# Patient Record
Sex: MALE | Race: WHITE | NOT HISPANIC OR LATINO | URBAN - NONMETROPOLITAN AREA
[De-identification: names, ages, dates, MRNs, and addresses within clinical notes are randomized per-mention and may not be internally consistent; named-entity substitution may affect disease eponyms.]

---

## 2017-05-02 ENCOUNTER — OFFICE (OUTPATIENT)
Dept: URBAN - NONMETROPOLITAN AREA CLINIC 13 | Facility: CLINIC | Age: 77
End: 2017-05-02
Payer: COMMERCIAL

## 2017-05-02 VITALS
WEIGHT: 226 LBS | DIASTOLIC BLOOD PRESSURE: 78 MMHG | HEIGHT: 73 IN | HEART RATE: 77 BPM | SYSTOLIC BLOOD PRESSURE: 113 MMHG | RESPIRATION RATE: 18 BRPM

## 2017-05-02 VITALS — HEIGHT: 73 IN

## 2017-05-02 DIAGNOSIS — N28.1 CYST OF KIDNEY, ACQUIRED: ICD-10-CM

## 2017-05-02 DIAGNOSIS — K59.09 OTHER CONSTIPATION: ICD-10-CM

## 2017-05-02 DIAGNOSIS — R13.19 OTHER DYSPHAGIA: ICD-10-CM

## 2017-05-02 DIAGNOSIS — K76.0 FATTY (CHANGE OF) LIVER, NOT ELSEWHERE CLASSIFIED: ICD-10-CM

## 2017-05-02 DIAGNOSIS — K21.9 GASTRO-ESOPHAGEAL REFLUX DISEASE WITHOUT ESOPHAGITIS: ICD-10-CM

## 2017-05-02 DIAGNOSIS — K64.9 UNSPECIFIED HEMORRHOIDS: ICD-10-CM

## 2017-05-02 DIAGNOSIS — R07.9 CHEST PAIN, UNSPECIFIED: ICD-10-CM

## 2017-05-02 DIAGNOSIS — R07.89 OTHER CHEST PAIN: ICD-10-CM

## 2017-05-02 PROCEDURE — 99213 OFFICE O/P EST LOW 20 MIN: CPT | Mod: 25

## 2017-05-02 PROCEDURE — 76700 US EXAM ABDOM COMPLETE: CPT | Mod: 26,TC

## 2017-05-02 RX ORDER — BACLOFEN 10 MG/1
TABLET ORAL
Qty: 45 | Refills: 5 | Status: COMPLETED
Start: 2017-05-02 | End: 2023-07-14

## 2017-06-13 ENCOUNTER — OFFICE (OUTPATIENT)
Dept: URBAN - NONMETROPOLITAN AREA CLINIC 13 | Facility: CLINIC | Age: 77
End: 2017-06-13
Payer: COMMERCIAL

## 2017-06-13 VITALS
DIASTOLIC BLOOD PRESSURE: 82 MMHG | HEART RATE: 67 BPM | WEIGHT: 223 LBS | SYSTOLIC BLOOD PRESSURE: 149 MMHG | HEIGHT: 73 IN

## 2017-06-13 DIAGNOSIS — R22.2 LOCALIZED SWELLING, MASS AND LUMP, TRUNK: ICD-10-CM

## 2017-06-13 DIAGNOSIS — R13.19 OTHER DYSPHAGIA: ICD-10-CM

## 2017-06-13 DIAGNOSIS — K76.0 FATTY (CHANGE OF) LIVER, NOT ELSEWHERE CLASSIFIED: ICD-10-CM

## 2017-06-13 DIAGNOSIS — N28.1 CYST OF KIDNEY, ACQUIRED: ICD-10-CM

## 2017-06-13 DIAGNOSIS — R07.9 CHEST PAIN, UNSPECIFIED: ICD-10-CM

## 2017-06-13 PROCEDURE — 99213 OFFICE O/P EST LOW 20 MIN: CPT

## 2017-06-13 RX ORDER — BACLOFEN 10 MG/1
TABLET ORAL
Qty: 45 | Refills: 5 | Status: COMPLETED
Start: 2017-05-02 | End: 2023-07-14

## 2017-06-13 RX ORDER — VITAMIN E 268 MG
CAPSULE ORAL
Qty: 60 | Refills: 5 | Status: ACTIVE
Start: 2016-09-19

## 2017-06-13 RX ORDER — ESOMEPRAZOLE MAGNESIUM 40 MG/1
CAPSULE, DELAYED RELEASE ORAL
Qty: 90 | Refills: 1 | Status: COMPLETED
End: 2023-07-14

## 2017-11-03 ENCOUNTER — OFFICE (OUTPATIENT)
Dept: URBAN - NONMETROPOLITAN AREA CLINIC 13 | Facility: CLINIC | Age: 77
End: 2017-11-03
Payer: MEDICARE

## 2017-11-03 ENCOUNTER — OFFICE (OUTPATIENT)
Dept: URBAN - NONMETROPOLITAN AREA CLINIC 13 | Facility: CLINIC | Age: 77
End: 2017-11-03
Payer: COMMERCIAL

## 2017-11-03 VITALS
DIASTOLIC BLOOD PRESSURE: 78 MMHG | HEIGHT: 73 IN | SYSTOLIC BLOOD PRESSURE: 121 MMHG | HEART RATE: 69 BPM | WEIGHT: 221 LBS

## 2017-11-03 VITALS — HEIGHT: 73 IN

## 2017-11-03 DIAGNOSIS — R10.11 RIGHT UPPER QUADRANT PAIN: ICD-10-CM

## 2017-11-03 DIAGNOSIS — K21.9 GASTRO-ESOPHAGEAL REFLUX DISEASE WITHOUT ESOPHAGITIS: ICD-10-CM

## 2017-11-03 DIAGNOSIS — Z79.899 OTHER LONG TERM (CURRENT) DRUG THERAPY: ICD-10-CM

## 2017-11-03 DIAGNOSIS — K76.0 FATTY (CHANGE OF) LIVER, NOT ELSEWHERE CLASSIFIED: ICD-10-CM

## 2017-11-03 DIAGNOSIS — G62.9 POLYNEUROPATHY, UNSPECIFIED: ICD-10-CM

## 2017-11-03 DIAGNOSIS — R13.19 OTHER DYSPHAGIA: ICD-10-CM

## 2017-11-03 DIAGNOSIS — R07.9 CHEST PAIN, UNSPECIFIED: ICD-10-CM

## 2017-11-03 DIAGNOSIS — R93.2 ABNORMAL FINDINGS ON DIAGNOSTIC IMAGING OF LIVER AND BILIARY: ICD-10-CM

## 2017-11-03 DIAGNOSIS — R11.0 NAUSEA: ICD-10-CM

## 2017-11-03 DIAGNOSIS — R14.0 ABDOMINAL DISTENSION (GASEOUS): ICD-10-CM

## 2017-11-03 DIAGNOSIS — R10.13 EPIGASTRIC PAIN: ICD-10-CM

## 2017-11-03 LAB
AMYLASE: 33.1 U/L (ref 25–125)
CBC SYSMEX: HEMATOCRIT: 40.3 % (ref 37–47)
CBC SYSMEX: HEMOGLOBIN: 13.8 G/DL — LOW (ref 14–18)
CBC SYSMEX: LYMPHS %: 31.5 % (ref 20.5–40.5)
CBC SYSMEX: LYMPHS ABSOLUTE: 2 10*3U/L (ref 1.3–2.9)
CBC SYSMEX: MCH: 29 PG (ref 27–32)
CBC SYSMEX: MCHC: 34.2 G/DL (ref 28.5–35)
CBC SYSMEX: MCV: 84.7 FL (ref 84–100)
CBC SYSMEX: MONOS %: 10.2 % — HIGH (ref 2–10)
CBC SYSMEX: MONOS ABSOLUTE: 0.7 10*3U/L (ref 0.3–3.8)
CBC SYSMEX: MPV: 8.4 FL (ref 7.4–10.4)
CBC SYSMEX: NEUT. %: 58.3 % (ref 43–65)
CBC SYSMEX: NEUT. ABSOLUTE: 3.7 10*3U/L (ref 2.2–4.8)
CBC SYSMEX: PLATELETS: 94 10*3U/L — LOW (ref 130–440)
CBC SYSMEX: RBC: 4.8 10*6U/L (ref 4.2–5.4)
CBC SYSMEX: RDW: 13.3 % (ref 11.5–15.5)
CBC SYSMEX: WBC: 6.4 10*3U/L (ref 4.5–10.5)
COMPLETE METABOLIC: ALBUMIN: 3.9 G/DL (ref 3.5–5.2)
COMPLETE METABOLIC: ALK. PHOS: 60 U/L (ref 40–150)
COMPLETE METABOLIC: ALT: 24 U/L (ref 0–55)
COMPLETE METABOLIC: AST: 32 U/L (ref 5–34)
COMPLETE METABOLIC: BUN: 16 MG/DL (ref 7–26)
COMPLETE METABOLIC: CALCIUM: 8.5 MG/DL (ref 8.4–10.2)
COMPLETE METABOLIC: CHLORIDE: 105 MMOL/L (ref 98–107)
COMPLETE METABOLIC: CO2: 29 MEQ/L (ref 22–29)
COMPLETE METABOLIC: CREATININE: 1.32 MG/DL — HIGH (ref 0.57–1.25)
COMPLETE METABOLIC: GFR - AFRICAN AMERICAN: 68.1 (ref 59–200)
COMPLETE METABOLIC: GFR - NON AFRICAN AMERICAN: 56.2 — CRITICAL LOW (ref 59–200)
COMPLETE METABOLIC: GLUCOSE: 96 MG/DL (ref 70–99)
COMPLETE METABOLIC: POTASSIUM: 3.7 MMOL/L (ref 3.5–5.3)
COMPLETE METABOLIC: SODIUM: 140 MMOL/L (ref 136–145)
COMPLETE METABOLIC: TOTAL BILIRUBIN: 1.3 MG/DL — HIGH (ref 0.2–1.2)
COMPLETE METABOLIC: TOTAL PROTEIN: 6 G/DL — LOW (ref 6.4–8.3)
LIPASE: 20 U/L (ref 8–78)

## 2017-11-03 PROCEDURE — 82150 ASSAY OF AMYLASE: CPT

## 2017-11-03 PROCEDURE — 99215 OFFICE O/P EST HI 40 MIN: CPT

## 2017-11-03 PROCEDURE — 85025 COMPLETE CBC W/AUTO DIFF WBC: CPT

## 2017-11-03 PROCEDURE — 80053 COMPREHEN METABOLIC PANEL: CPT

## 2017-11-03 PROCEDURE — 83690 ASSAY OF LIPASE: CPT

## 2017-11-08 ENCOUNTER — AMBULATORY SURGICAL CENTER (OUTPATIENT)
Dept: URBAN - NONMETROPOLITAN AREA SURGERY 2 | Facility: SURGERY | Age: 77
End: 2017-11-08
Payer: COMMERCIAL

## 2017-11-08 ENCOUNTER — OFFICE (OUTPATIENT)
Dept: URBAN - NONMETROPOLITAN AREA CLINIC 13 | Facility: CLINIC | Age: 77
End: 2017-11-08
Payer: COMMERCIAL

## 2017-11-08 VITALS
DIASTOLIC BLOOD PRESSURE: 100 MMHG | SYSTOLIC BLOOD PRESSURE: 181 MMHG | OXYGEN SATURATION: 93 % | SYSTOLIC BLOOD PRESSURE: 168 MMHG | OXYGEN SATURATION: 98 % | DIASTOLIC BLOOD PRESSURE: 110 MMHG | HEART RATE: 83 BPM | SYSTOLIC BLOOD PRESSURE: 173 MMHG | DIASTOLIC BLOOD PRESSURE: 97 MMHG | HEART RATE: 71 BPM | SYSTOLIC BLOOD PRESSURE: 179 MMHG | OXYGEN SATURATION: 99 % | RESPIRATION RATE: 18 BRPM | HEIGHT: 73 IN | SYSTOLIC BLOOD PRESSURE: 191 MMHG | RESPIRATION RATE: 16 BRPM | HEART RATE: 76 BPM | DIASTOLIC BLOOD PRESSURE: 87 MMHG | HEART RATE: 66 BPM | HEART RATE: 82 BPM | HEART RATE: 73 BPM | DIASTOLIC BLOOD PRESSURE: 81 MMHG | WEIGHT: 221 LBS | HEART RATE: 68 BPM | SYSTOLIC BLOOD PRESSURE: 148 MMHG | DIASTOLIC BLOOD PRESSURE: 124 MMHG | DIASTOLIC BLOOD PRESSURE: 104 MMHG | SYSTOLIC BLOOD PRESSURE: 155 MMHG | OXYGEN SATURATION: 92 %

## 2017-11-08 VITALS — HEIGHT: 73 IN

## 2017-11-08 DIAGNOSIS — R13.19 OTHER DYSPHAGIA: ICD-10-CM

## 2017-11-08 DIAGNOSIS — R10.11 RIGHT UPPER QUADRANT PAIN: ICD-10-CM

## 2017-11-08 DIAGNOSIS — K22.2 ESOPHAGEAL OBSTRUCTION: ICD-10-CM

## 2017-11-08 DIAGNOSIS — K29.70 GASTRITIS, UNSPECIFIED, WITHOUT BLEEDING: ICD-10-CM

## 2017-11-08 DIAGNOSIS — R11.0 NAUSEA: ICD-10-CM

## 2017-11-08 DIAGNOSIS — K76.0 FATTY (CHANGE OF) LIVER, NOT ELSEWHERE CLASSIFIED: ICD-10-CM

## 2017-11-08 DIAGNOSIS — K21.9 GASTRO-ESOPHAGEAL REFLUX DISEASE WITHOUT ESOPHAGITIS: ICD-10-CM

## 2017-11-08 DIAGNOSIS — R10.13 EPIGASTRIC PAIN: ICD-10-CM

## 2017-11-08 DIAGNOSIS — R07.89 OTHER CHEST PAIN: ICD-10-CM

## 2017-11-08 PROCEDURE — G8918 PT W/O PREOP ORDER IV AB PRO: HCPCS | Performed by: INTERNAL MEDICINE

## 2017-11-08 PROCEDURE — 74170 CT ABD WO CNTRST FLWD CNTRST: CPT | Mod: TC

## 2017-11-08 PROCEDURE — 43248 EGD GUIDE WIRE INSERTION: CPT | Performed by: INTERNAL MEDICINE

## 2017-11-08 PROCEDURE — 43239 EGD BIOPSY SINGLE/MULTIPLE: CPT | Performed by: INTERNAL MEDICINE

## 2017-11-08 PROCEDURE — G9654 MON ANESTH CARE: HCPCS

## 2017-11-08 PROCEDURE — G8907 PT DOC NO EVENTS ON DISCHARG: HCPCS | Performed by: INTERNAL MEDICINE

## 2017-11-08 PROCEDURE — 00740: CPT | Mod: QS,QZ

## 2017-11-08 RX ORDER — VITAMIN E 268 MG
CAPSULE ORAL
Qty: 60 | Refills: 5 | Status: ACTIVE
Start: 2016-09-19

## 2017-11-08 RX ORDER — ESOMEPRAZOLE MAGNESIUM 40 MG/1
CAPSULE, DELAYED RELEASE ORAL
Qty: 90 | Refills: 1 | Status: COMPLETED
End: 2023-07-14

## 2017-11-08 RX ORDER — BACLOFEN 10 MG/1
TABLET ORAL
Qty: 45 | Refills: 5 | Status: COMPLETED
Start: 2017-05-02 | End: 2023-07-14

## 2017-11-08 RX ORDER — RANITIDINE HYDROCHLORIDE 300 MG/1
TABLET, FILM COATED ORAL
Qty: 180 | Refills: 1 | Status: COMPLETED
Start: 2017-11-08 | End: 2023-07-14

## 2017-11-08 NOTE — SERVICENOTES
Patient premedicated with 50mg Benadryl and 125mg solumedrol prior to IV contrast injection due to allergy

## 2017-11-09 ENCOUNTER — OFFICE (OUTPATIENT)
Dept: URBAN - NONMETROPOLITAN AREA CLINIC 13 | Facility: CLINIC | Age: 77
End: 2017-11-09
Payer: MEDICARE

## 2017-11-09 VITALS — HEIGHT: 73 IN

## 2017-11-09 DIAGNOSIS — Z79.01 LONG TERM (CURRENT) USE OF ANTICOAGULANTS: ICD-10-CM

## 2017-11-09 PROCEDURE — 85610 PROTHROMBIN TIME: CPT

## 2017-11-13 LAB — SURGICAL PROCEDURE: PDF REPORT: (no result)

## 2018-10-31 ENCOUNTER — OFFICE (OUTPATIENT)
Dept: URBAN - NONMETROPOLITAN AREA CLINIC 13 | Facility: CLINIC | Age: 78
End: 2018-10-31
Payer: MEDICARE

## 2018-10-31 ENCOUNTER — OFFICE (OUTPATIENT)
Dept: URBAN - NONMETROPOLITAN AREA CLINIC 13 | Facility: CLINIC | Age: 78
End: 2018-10-31
Payer: COMMERCIAL

## 2018-10-31 VITALS
OXYGEN SATURATION: 99 % | WEIGHT: 234 LBS | DIASTOLIC BLOOD PRESSURE: 70 MMHG | SYSTOLIC BLOOD PRESSURE: 118 MMHG | HEIGHT: 73 IN | HEART RATE: 76 BPM

## 2018-10-31 VITALS — HEIGHT: 73 IN

## 2018-10-31 DIAGNOSIS — K76.0 FATTY (CHANGE OF) LIVER, NOT ELSEWHERE CLASSIFIED: ICD-10-CM

## 2018-10-31 DIAGNOSIS — K21.9 GASTRO-ESOPHAGEAL REFLUX DISEASE WITHOUT ESOPHAGITIS: ICD-10-CM

## 2018-10-31 DIAGNOSIS — K64.9 UNSPECIFIED HEMORRHOIDS: ICD-10-CM

## 2018-10-31 DIAGNOSIS — R14.0 ABDOMINAL DISTENSION (GASEOUS): ICD-10-CM

## 2018-10-31 LAB
CBC SYSMEX: HEMATOCRIT: 32.9 % — LOW (ref 37–47)
CBC SYSMEX: HEMOGLOBIN: 10.4 G/DL — LOW (ref 14–18)
CBC SYSMEX: LYMPHS %: 21.9 % (ref 20.5–40.5)
CBC SYSMEX: LYMPHS ABSOLUTE: 1.4 10*3U/L (ref 1.3–2.9)
CBC SYSMEX: MCH: 24.6 PG — LOW (ref 27–32)
CBC SYSMEX: MCHC: 31.6 G/DL (ref 28.5–35)
CBC SYSMEX: MCV: 77.8 FL — LOW (ref 84–100)
CBC SYSMEX: MONOS %: 12.9 % — HIGH (ref 2–10)
CBC SYSMEX: MONOS ABSOLUTE: 0.8 10*3U/L (ref 0.3–3.8)
CBC SYSMEX: MPV: 8 FL — LOW (ref 8.3–11.9)
CBC SYSMEX: NEUT. %: 65.2 % (ref 43–67)
CBC SYSMEX: NEUT. ABSOLUTE: 4 10*3U/L (ref 2.2–4.8)
CBC SYSMEX: PLATELETS: 122 10*3U/L — LOW (ref 130–440)
CBC SYSMEX: RBC: 4.2 10*6U/L (ref 4.2–5.4)
CBC SYSMEX: RDW: 13.9 % (ref 11.5–15.5)
CBC SYSMEX: WBC: 6.2 10*3U/L (ref 4.5–10.5)
COMPLETE METABOLIC: ALBUMIN: 3.7 G/DL (ref 3.5–5.2)
COMPLETE METABOLIC: ALK. PHOS: 57 U/L (ref 40–150)
COMPLETE METABOLIC: ALT: 19 U/L (ref 0–55)
COMPLETE METABOLIC: AST: 24 U/L (ref 5–34)
COMPLETE METABOLIC: BUN: 16 MG/DL (ref 7–26)
COMPLETE METABOLIC: CALCIUM: 8.7 MG/DL (ref 8.4–10.3)
COMPLETE METABOLIC: CHLORIDE: 106 MMOL/L (ref 98–107)
COMPLETE METABOLIC: CO2: 28 MEQ/L (ref 22–29)
COMPLETE METABOLIC: CREATININE: 1.4 MG/DL — HIGH (ref 0.57–1.25)
COMPLETE METABOLIC: GFR - AFRICAN AMERICAN: 63.5 (ref 59–200)
COMPLETE METABOLIC: GFR - NON AFRICAN AMERICAN: 52.4 — CRITICAL LOW (ref 59–200)
COMPLETE METABOLIC: GLUCOSE: 103 MG/DL — HIGH (ref 70–99)
COMPLETE METABOLIC: POTASSIUM: 3.5 MMOL/L (ref 3.5–5.3)
COMPLETE METABOLIC: SODIUM: 140 MMOL/L (ref 136–145)
COMPLETE METABOLIC: TOTAL BILIRUBIN: 1.1 MG/DL (ref 0.2–1.2)
COMPLETE METABOLIC: TOTAL PROTEIN: 6 G/DL — LOW (ref 6.4–8.3)

## 2018-10-31 PROCEDURE — 80053 COMPREHEN METABOLIC PANEL: CPT | Performed by: NURSE PRACTITIONER

## 2018-10-31 PROCEDURE — 99213 OFFICE O/P EST LOW 20 MIN: CPT | Performed by: NURSE PRACTITIONER

## 2018-10-31 PROCEDURE — 85025 COMPLETE CBC W/AUTO DIFF WBC: CPT | Performed by: NURSE PRACTITIONER

## 2018-10-31 PROCEDURE — 36415 COLL VENOUS BLD VENIPUNCTURE: CPT | Performed by: NURSE PRACTITIONER

## 2018-10-31 RX ORDER — HYDROCORTISONE 25 MG/G
CREAM TOPICAL
Qty: 30 | Refills: 1 | Status: COMPLETED
Start: 2018-10-31 | End: 2023-07-14

## 2022-08-25 ENCOUNTER — OFFICE (OUTPATIENT)
Dept: URBAN - NONMETROPOLITAN AREA CLINIC 13 | Facility: CLINIC | Age: 82
End: 2022-08-25
Payer: COMMERCIAL

## 2022-08-25 VITALS
HEIGHT: 73 IN | OXYGEN SATURATION: 95 % | SYSTOLIC BLOOD PRESSURE: 142 MMHG | WEIGHT: 228 LBS | SYSTOLIC BLOOD PRESSURE: 105 MMHG | DIASTOLIC BLOOD PRESSURE: 65 MMHG | HEART RATE: 80 BPM | DIASTOLIC BLOOD PRESSURE: 57 MMHG

## 2022-08-25 VITALS — HEIGHT: 73 IN

## 2022-08-25 DIAGNOSIS — R13.10 DYSPHAGIA, UNSPECIFIED: ICD-10-CM

## 2022-08-25 DIAGNOSIS — R10.33 PERIUMBILICAL PAIN: ICD-10-CM

## 2022-08-25 DIAGNOSIS — K76.0 FATTY (CHANGE OF) LIVER, NOT ELSEWHERE CLASSIFIED: ICD-10-CM

## 2022-08-25 DIAGNOSIS — R10.13 EPIGASTRIC PAIN: ICD-10-CM

## 2022-08-25 LAB
AMYLASE: 38 UNITS/L (ref 30–110)
CBC WITH WBC (DIFF): ACANTHOCYTE: NORMAL
CBC WITH WBC (DIFF): AGRANULAR NEUTROPHIL: NORMAL
CBC WITH WBC (DIFF): ANISOCYTOSIS: (no result)
CBC WITH WBC (DIFF): ATYPICAL LYMPHOCYTE: NORMAL
CBC WITH WBC (DIFF): AUER RODS: NORMAL
CBC WITH WBC (DIFF): BAND: NORMAL
CBC WITH WBC (DIFF): BASOPHIL: 2 % — HIGH (ref 0–1)
CBC WITH WBC (DIFF): BASOPHILIC STIPPLING: NORMAL
CBC WITH WBC (DIFF): BI-LOBED NEUTROPHIL: NORMAL
CBC WITH WBC (DIFF): BLAST: NORMAL
CBC WITH WBC (DIFF): BURR CELL: NORMAL
CBC WITH WBC (DIFF): DIMORPHIC RBC POPULATION: NORMAL
CBC WITH WBC (DIFF): DOHLE BODIES: NORMAL
CBC WITH WBC (DIFF): ELLIPTOCYTE: NORMAL
CBC WITH WBC (DIFF): EOSINOPHIL: 3 % (ref 0–5)
CBC WITH WBC (DIFF): GIANT PLATELET: NORMAL
CBC WITH WBC (DIFF): HEMATOCRIT: 26.6 % — LOW (ref 41–53)
CBC WITH WBC (DIFF): HEMOGLOBIN: 6.4 G/DL — LOW (ref 14–18)
CBC WITH WBC (DIFF): HOWELL JOLLY BODIES: NORMAL
CBC WITH WBC (DIFF): HYPERSEGMENTED NEUTROPHIL: NORMAL
CBC WITH WBC (DIFF): HYPOCHROMIA: (no result)
CBC WITH WBC (DIFF): HYPOGRANULAR NEUTROPHIL: NORMAL
CBC WITH WBC (DIFF): IMMATURE GRANULOCYTES: NORMAL
CBC WITH WBC (DIFF): LARGE PLATELET: NORMAL
CBC WITH WBC (DIFF): LYMPHOCYTE: 21 % (ref 20–40)
CBC WITH WBC (DIFF): MACROCYTOSIS: NORMAL
CBC WITH WBC (DIFF): MEAN CELL HEMOGLOBIN CONCENTRATION: 24.1 G/DL — LOW (ref 33–37)
CBC WITH WBC (DIFF): MEAN CELL HEMOGLOBIN: 17.1 PG — LOW (ref 27–31)
CBC WITH WBC (DIFF): MEAN CELL VOLUME: 71 FL — LOW (ref 84–100)
CBC WITH WBC (DIFF): MEAN PLATELET VOLUME: 9.3 FL (ref 8.3–11.9)
CBC WITH WBC (DIFF): METAMYELOCYTE: NORMAL
CBC WITH WBC (DIFF): MICROCYTOSIS: PRESENT
CBC WITH WBC (DIFF): MIX CELLS: NORMAL
CBC WITH WBC (DIFF): MONOCYTE: 7 % (ref 1–10)
CBC WITH WBC (DIFF): MYELOCYTE: NORMAL
CBC WITH WBC (DIFF): NEUTROPHIL SEGMENTED: 68 % (ref 50–70)
CBC WITH WBC (DIFF): NOTE: NORMAL
CBC WITH WBC (DIFF): NUCLEATED RBC: 0 /100WBC (ref 0–0)
CBC WITH WBC (DIFF): OVALOCYTE: NORMAL
CBC WITH WBC (DIFF): PAPPENHEIMER BODIES: NORMAL
CBC WITH WBC (DIFF): PATHOLOGIST INTERPRETATION: NORMAL
CBC WITH WBC (DIFF): PLATELET CLUMPS: NORMAL
CBC WITH WBC (DIFF): PLATELET COUNT: 150 /NL (ref 140–440)
CBC WITH WBC (DIFF): PLT SATELLITOSIS: NORMAL
CBC WITH WBC (DIFF): POIKILOCYTOSIS: NORMAL
CBC WITH WBC (DIFF): POLYCHROMASIA: NORMAL
CBC WITH WBC (DIFF): PROMYELOCYTE: NORMAL
CBC WITH WBC (DIFF): RBC MORPHOLOGY: (no result)
CBC WITH WBC (DIFF): REACT LYMPH ABS MAN: NORMAL
CBC WITH WBC (DIFF): REACTIVE LYMPHOCYTE: NORMAL
CBC WITH WBC (DIFF): RED BLOOD CELL: 3.75 /PL — LOW (ref 4.7–6.1)
CBC WITH WBC (DIFF): RED CELL DIAMETER WIDTH: 48 FL (ref 35–48)
CBC WITH WBC (DIFF): ROULEAUX RBC: NORMAL
CBC WITH WBC (DIFF): SCHISTOCYTE: NORMAL
CBC WITH WBC (DIFF): SICKLE CELL: NORMAL
CBC WITH WBC (DIFF): SMUDGE CELLS: NORMAL
CBC WITH WBC (DIFF): SPHEROCYTE: NORMAL
CBC WITH WBC (DIFF): STOMATOCYTE: NORMAL
CBC WITH WBC (DIFF): TARGET CELL: NORMAL
CBC WITH WBC (DIFF): TEAR DROP CELL: NORMAL
CBC WITH WBC (DIFF): TOXIC GRANULATION: NORMAL
CBC WITH WBC (DIFF): UNCLASSIFIED CELL: NORMAL
CBC WITH WBC (DIFF): VACUOLATED NEUTROPHIL: NORMAL
CBC WITH WBC (DIFF): WBC MORPHOLOGY: NORMAL
CBC WITH WBC (DIFF): WHITE BLOOD CELL: 6.1 /NL (ref 4.8–11)
COMPREHENSIVE METABOLIC PANEL: ALBUMIN: 4 G/DL (ref 3.5–4.8)
COMPREHENSIVE METABOLIC PANEL: ALKALINE PHOSPHATASE: 45 UNITS/L (ref 36–126)
COMPREHENSIVE METABOLIC PANEL: ALT: 14 UNITS/L (ref ?–49)
COMPREHENSIVE METABOLIC PANEL: ANION GAP: 6 MMOL/L — LOW (ref 7–16)
COMPREHENSIVE METABOLIC PANEL: AST: 20 UNITS/L (ref 17–59)
COMPREHENSIVE METABOLIC PANEL: BILIRUBIN, TOTAL: 1.2 MG/DL (ref 0.2–1.3)
COMPREHENSIVE METABOLIC PANEL: BUN/CREATININE RATIO: 13.4
COMPREHENSIVE METABOLIC PANEL: CALCIUM: 8.6 MG/DL (ref 8.4–10.2)
COMPREHENSIVE METABOLIC PANEL: CARBON DIOXIDE: 27 MMOL/L (ref 22–30)
COMPREHENSIVE METABOLIC PANEL: CHLORIDE: 105 MMOL/L (ref 98–107)
COMPREHENSIVE METABOLIC PANEL: CREATININE: 1.42 MG/DL — HIGH (ref 0.66–1.25)
COMPREHENSIVE METABOLIC PANEL: GLUCOSE: 97 MG/DL (ref 75–110)
COMPREHENSIVE METABOLIC PANEL: POTASSIUM: 4.5 MMOL/L (ref 3.5–5.3)
COMPREHENSIVE METABOLIC PANEL: PROTEIN, TOTAL, SERUM: 5.8 G/DL — LOW (ref 6.3–8.2)
COMPREHENSIVE METABOLIC PANEL: SODIUM: 138 MMOL/L (ref 137–145)
COMPREHENSIVE METABOLIC PANEL: UREA NITROGEN (BUN): 19 MG/DL (ref 9–20)
GFR: EGFR AFRICAN AMERICAN: 58 ML/MIN/1.73M2 — LOW (ref 60–?)
GFR: EGFR NON-AFR.AMERICAN: 48 ML/MIN/1.73M2 — LOW (ref 60–?)
LIPASE: 42 UNITS/L (ref 23–300)

## 2022-08-25 PROCEDURE — 99203 OFFICE O/P NEW LOW 30 MIN: CPT | Mod: 25 | Performed by: NURSE PRACTITIONER

## 2022-08-25 PROCEDURE — 76700 US EXAM ABDOM COMPLETE: CPT | Mod: TC | Performed by: NURSE PRACTITIONER

## 2022-08-25 RX ORDER — FAMOTIDINE 40 MG/1
TABLET, FILM COATED ORAL
Qty: 90 | Refills: 1 | Status: COMPLETED
Start: 2022-08-25 | End: 2023-07-14

## 2022-11-29 ENCOUNTER — OFFICE (OUTPATIENT)
Dept: URBAN - NONMETROPOLITAN AREA CLINIC 13 | Facility: CLINIC | Age: 82
End: 2022-11-29
Payer: COMMERCIAL

## 2022-11-29 VITALS
OXYGEN SATURATION: 96 % | WEIGHT: 225 LBS | DIASTOLIC BLOOD PRESSURE: 88 MMHG | SYSTOLIC BLOOD PRESSURE: 143 MMHG | HEART RATE: 68 BPM | HEIGHT: 73 IN

## 2022-11-29 DIAGNOSIS — R13.10 DYSPHAGIA, UNSPECIFIED: ICD-10-CM

## 2022-11-29 DIAGNOSIS — K21.9 GASTRO-ESOPHAGEAL REFLUX DISEASE WITHOUT ESOPHAGITIS: ICD-10-CM

## 2022-11-29 DIAGNOSIS — D50.9 IRON DEFICIENCY ANEMIA, UNSPECIFIED: ICD-10-CM

## 2022-11-29 DIAGNOSIS — K59.00 CONSTIPATION, UNSPECIFIED: ICD-10-CM

## 2022-11-29 PROCEDURE — 99213 OFFICE O/P EST LOW 20 MIN: CPT | Performed by: NURSE PRACTITIONER

## 2022-11-30 LAB
CBC WITH WBC (DIFF): ACANTHOCYTE: NORMAL
CBC WITH WBC (DIFF): AGRANULAR NEUTROPHIL: NORMAL
CBC WITH WBC (DIFF): ANISOCYTOSIS: NORMAL
CBC WITH WBC (DIFF): ATYPICAL LYMPHOCYTE: NORMAL
CBC WITH WBC (DIFF): AUER RODS: NORMAL
CBC WITH WBC (DIFF): BAND: NORMAL
CBC WITH WBC (DIFF): BASOPHIL: 1 % (ref 0–1)
CBC WITH WBC (DIFF): BASOPHILIC STIPPLING: NORMAL
CBC WITH WBC (DIFF): BI-LOBED NEUTROPHIL: NORMAL
CBC WITH WBC (DIFF): BLAST: NORMAL
CBC WITH WBC (DIFF): BURR CELL: NORMAL
CBC WITH WBC (DIFF): DIMORPHIC RBC POPULATION: NORMAL
CBC WITH WBC (DIFF): DOHLE BODIES: NORMAL
CBC WITH WBC (DIFF): ELLIPTOCYTE: NORMAL
CBC WITH WBC (DIFF): EOSINOPHIL: 8 % — HIGH (ref 0–5)
CBC WITH WBC (DIFF): GIANT PLATELET: NORMAL
CBC WITH WBC (DIFF): HEMATOCRIT: 43.7 % (ref 41–53)
CBC WITH WBC (DIFF): HEMOGLOBIN: 14 G/DL (ref 14–18)
CBC WITH WBC (DIFF): HOWELL JOLLY BODIES: NORMAL
CBC WITH WBC (DIFF): HYPERSEGMENTED NEUTROPHIL: NORMAL
CBC WITH WBC (DIFF): HYPOCHROMIA: NORMAL
CBC WITH WBC (DIFF): HYPOGRANULAR NEUTROPHIL: NORMAL
CBC WITH WBC (DIFF): IMMATURE GRANULOCYTES: 0 % (ref 0–1)
CBC WITH WBC (DIFF): LARGE PLATELET: NORMAL
CBC WITH WBC (DIFF): LYMPHOCYTE: 24 % (ref 20–40)
CBC WITH WBC (DIFF): MACROCYTOSIS: NORMAL
CBC WITH WBC (DIFF): MEAN CELL HEMOGLOBIN CONCENTRATION: 32 G/DL — LOW (ref 33–37)
CBC WITH WBC (DIFF): MEAN CELL HEMOGLOBIN: 28.6 PG (ref 27–31)
CBC WITH WBC (DIFF): MEAN CELL VOLUME: 89 FL (ref 84–100)
CBC WITH WBC (DIFF): MEAN PLATELET VOLUME: 8.6 FL (ref 8.3–11.9)
CBC WITH WBC (DIFF): METAMYELOCYTE: NORMAL
CBC WITH WBC (DIFF): MICROCYTOSIS: NORMAL
CBC WITH WBC (DIFF): MIX CELLS: NORMAL
CBC WITH WBC (DIFF): MONOCYTE: 8 % (ref 1–10)
CBC WITH WBC (DIFF): MYELOCYTE: NORMAL
CBC WITH WBC (DIFF): NEUTROPHIL SEGMENTED: 59 % (ref 50–70)
CBC WITH WBC (DIFF): NOTE: NORMAL
CBC WITH WBC (DIFF): NUCLEATED RBC: 0 /100WBC (ref 0–0)
CBC WITH WBC (DIFF): OVALOCYTE: NORMAL
CBC WITH WBC (DIFF): PAPPENHEIMER BODIES: NORMAL
CBC WITH WBC (DIFF): PATHOLOGIST INTERPRETATION: NORMAL
CBC WITH WBC (DIFF): PLATELET CLUMPS: NORMAL
CBC WITH WBC (DIFF): PLATELET COUNT: 99 /NL — LOW (ref 140–440)
CBC WITH WBC (DIFF): PLT SATELLITOSIS: NORMAL
CBC WITH WBC (DIFF): POIKILOCYTOSIS: NORMAL
CBC WITH WBC (DIFF): POLYCHROMASIA: NORMAL
CBC WITH WBC (DIFF): PROMYELOCYTE: NORMAL
CBC WITH WBC (DIFF): RBC MORPHOLOGY: NORMAL
CBC WITH WBC (DIFF): REACT LYMPH ABS MAN: NORMAL
CBC WITH WBC (DIFF): REACTIVE LYMPHOCYTE: NORMAL
CBC WITH WBC (DIFF): RED BLOOD CELL: 4.9 /PL (ref 4.7–6.1)
CBC WITH WBC (DIFF): RED CELL DIAMETER WIDTH: 52 FL — HIGH (ref 35–48)
CBC WITH WBC (DIFF): ROULEAUX RBC: NORMAL
CBC WITH WBC (DIFF): SCHISTOCYTE: NORMAL
CBC WITH WBC (DIFF): SICKLE CELL: NORMAL
CBC WITH WBC (DIFF): SMUDGE CELLS: NORMAL
CBC WITH WBC (DIFF): SPHEROCYTE: NORMAL
CBC WITH WBC (DIFF): STOMATOCYTE: NORMAL
CBC WITH WBC (DIFF): TARGET CELL: NORMAL
CBC WITH WBC (DIFF): TEAR DROP CELL: NORMAL
CBC WITH WBC (DIFF): TOXIC GRANULATION: NORMAL
CBC WITH WBC (DIFF): UNCLASSIFIED CELL: NORMAL
CBC WITH WBC (DIFF): VACUOLATED NEUTROPHIL: NORMAL
CBC WITH WBC (DIFF): WBC MORPHOLOGY: NORMAL
CBC WITH WBC (DIFF): WHITE BLOOD CELL: 5.6 /NL (ref 4.8–11)
COMPREHENSIVE METABOLIC PANEL: ALBUMIN: 3.8 G/DL (ref 3.5–4.8)
COMPREHENSIVE METABOLIC PANEL: ALKALINE PHOSPHATASE: 55 UNITS/L (ref 36–126)
COMPREHENSIVE METABOLIC PANEL: ALT: 22 UNITS/L (ref ?–49)
COMPREHENSIVE METABOLIC PANEL: ANION GAP: 11 MMOL/L (ref 7–16)
COMPREHENSIVE METABOLIC PANEL: AST: 31 UNITS/L (ref 17–59)
COMPREHENSIVE METABOLIC PANEL: BILIRUBIN, TOTAL: 0.9 MG/DL (ref 0.2–1.3)
COMPREHENSIVE METABOLIC PANEL: BUN/CREATININE RATIO: 14.2
COMPREHENSIVE METABOLIC PANEL: CALCIUM: 8.9 MG/DL (ref 8.4–10.2)
COMPREHENSIVE METABOLIC PANEL: CARBON DIOXIDE: 29 MMOL/L (ref 22–30)
COMPREHENSIVE METABOLIC PANEL: CHLORIDE: 107 MMOL/L (ref 98–107)
COMPREHENSIVE METABOLIC PANEL: CREATININE: 1.48 MG/DL — HIGH (ref 0.66–1.25)
COMPREHENSIVE METABOLIC PANEL: GLUCOSE: 106 MG/DL (ref 75–110)
COMPREHENSIVE METABOLIC PANEL: POTASSIUM: 4.1 MMOL/L (ref 3.5–5.3)
COMPREHENSIVE METABOLIC PANEL: PROTEIN, TOTAL, SERUM: 5.9 G/DL — LOW (ref 6.3–8.2)
COMPREHENSIVE METABOLIC PANEL: SODIUM: 147 MMOL/L — HIGH (ref 137–145)
COMPREHENSIVE METABOLIC PANEL: UREA NITROGEN (BUN): 21 MG/DL — HIGH (ref 9–20)
FERRITIN: 29.2 NG/ML (ref 18–464)
GFR: EGFR AFRICAN AMERICAN: 55 ML/MIN/1.73M2 — LOW (ref 60–?)
GFR: EGFR NON-AFR.AMERICAN: 46 ML/MIN/1.73M2 — LOW (ref 60–?)
IRON AND TOTAL IRON BINDING CAPACITY: % SATURATION: 23 % (ref 11–40)
IRON AND TOTAL IRON BINDING CAPACITY: IRON BINDING CAPACITY: 313 MCG/DL (ref 261–462)
IRON AND TOTAL IRON BINDING CAPACITY: IRON, TOTAL: 71 MCG/DL (ref 49–181)

## 2023-02-28 ENCOUNTER — OFFICE (OUTPATIENT)
Dept: URBAN - NONMETROPOLITAN AREA CLINIC 13 | Facility: CLINIC | Age: 83
End: 2023-02-28
Payer: COMMERCIAL

## 2023-02-28 VITALS — HEIGHT: 73 IN

## 2023-02-28 VITALS
OXYGEN SATURATION: 96 % | HEIGHT: 73 IN | DIASTOLIC BLOOD PRESSURE: 85 MMHG | WEIGHT: 225 LBS | HEART RATE: 74 BPM | SYSTOLIC BLOOD PRESSURE: 153 MMHG | SYSTOLIC BLOOD PRESSURE: 158 MMHG

## 2023-02-28 DIAGNOSIS — K59.00 CONSTIPATION, UNSPECIFIED: ICD-10-CM

## 2023-02-28 DIAGNOSIS — K21.9 GASTRO-ESOPHAGEAL REFLUX DISEASE WITHOUT ESOPHAGITIS: ICD-10-CM

## 2023-02-28 DIAGNOSIS — R22.1 LOCALIZED SWELLING, MASS AND LUMP, NECK: ICD-10-CM

## 2023-02-28 DIAGNOSIS — R13.10 DYSPHAGIA, UNSPECIFIED: ICD-10-CM

## 2023-02-28 PROCEDURE — 76536 US EXAM OF HEAD AND NECK: CPT | Mod: TC | Performed by: NURSE PRACTITIONER

## 2023-02-28 PROCEDURE — 99213 OFFICE O/P EST LOW 20 MIN: CPT | Mod: 25 | Performed by: NURSE PRACTITIONER
